# Patient Record
Sex: FEMALE | Race: BLACK OR AFRICAN AMERICAN
[De-identification: names, ages, dates, MRNs, and addresses within clinical notes are randomized per-mention and may not be internally consistent; named-entity substitution may affect disease eponyms.]

---

## 2020-07-15 ENCOUNTER — TRANSCRIPTION ENCOUNTER (OUTPATIENT)
Age: 45
End: 2020-07-15

## 2020-11-21 ENCOUNTER — TRANSCRIPTION ENCOUNTER (OUTPATIENT)
Age: 45
End: 2020-11-21

## 2020-12-22 ENCOUNTER — TRANSCRIPTION ENCOUNTER (OUTPATIENT)
Age: 45
End: 2020-12-22

## 2021-12-23 ENCOUNTER — TRANSCRIPTION ENCOUNTER (OUTPATIENT)
Age: 46
End: 2021-12-23

## 2022-03-26 ENCOUNTER — TRANSCRIPTION ENCOUNTER (OUTPATIENT)
Age: 47
End: 2022-03-26

## 2022-05-11 ENCOUNTER — NON-APPOINTMENT (OUTPATIENT)
Age: 47
End: 2022-05-11

## 2022-05-26 ENCOUNTER — NON-APPOINTMENT (OUTPATIENT)
Age: 47
End: 2022-05-26

## 2022-08-01 ENCOUNTER — NON-APPOINTMENT (OUTPATIENT)
Age: 47
End: 2022-08-01

## 2022-08-12 ENCOUNTER — NON-APPOINTMENT (OUTPATIENT)
Age: 47
End: 2022-08-12

## 2022-09-22 ENCOUNTER — NON-APPOINTMENT (OUTPATIENT)
Age: 47
End: 2022-09-22

## 2022-09-27 ENCOUNTER — NON-APPOINTMENT (OUTPATIENT)
Age: 47
End: 2022-09-27

## 2022-10-12 PROBLEM — Z00.00 ENCOUNTER FOR PREVENTIVE HEALTH EXAMINATION: Status: ACTIVE | Noted: 2022-10-12

## 2022-10-13 ENCOUNTER — APPOINTMENT (OUTPATIENT)
Dept: SURGERY | Facility: CLINIC | Age: 47
End: 2022-10-13

## 2022-10-13 VITALS
DIASTOLIC BLOOD PRESSURE: 76 MMHG | SYSTOLIC BLOOD PRESSURE: 124 MMHG | WEIGHT: 220 LBS | BODY MASS INDEX: 34.53 KG/M2 | HEART RATE: 76 BPM | HEIGHT: 67 IN

## 2022-10-13 DIAGNOSIS — L72.9 FOLLICULAR CYST OF THE SKIN AND SUBCUTANEOUS TISSUE, UNSPECIFIED: ICD-10-CM

## 2022-10-13 DIAGNOSIS — D05.10 INTRADUCTAL CARCINOMA IN SITU OF UNSPECIFIED BREAST: ICD-10-CM

## 2022-10-13 DIAGNOSIS — G47.30 SLEEP APNEA, UNSPECIFIED: ICD-10-CM

## 2022-10-13 DIAGNOSIS — Z78.9 OTHER SPECIFIED HEALTH STATUS: ICD-10-CM

## 2022-10-13 DIAGNOSIS — L08.9 FOLLICULAR CYST OF THE SKIN AND SUBCUTANEOUS TISSUE, UNSPECIFIED: ICD-10-CM

## 2022-10-13 DIAGNOSIS — M19.90 UNSPECIFIED OSTEOARTHRITIS, UNSPECIFIED SITE: ICD-10-CM

## 2022-10-13 PROCEDURE — 99203 OFFICE O/P NEW LOW 30 MIN: CPT | Mod: 25

## 2022-10-13 PROCEDURE — 10060 I&D ABSCESS SIMPLE/SINGLE: CPT

## 2022-10-13 RX ORDER — TRAZODONE HYDROCHLORIDE 300 MG/1
TABLET ORAL
Refills: 0 | Status: ACTIVE | COMMUNITY

## 2022-10-13 RX ORDER — TAMOXIFEN CITRATE 20 MG/1
TABLET, FILM COATED ORAL
Refills: 0 | Status: ACTIVE | COMMUNITY

## 2022-10-13 NOTE — PHYSICAL EXAM
[Alert] : alert [Oriented to Person] : oriented to person [Oriented to Place] : oriented to place [Oriented to Time] : oriented to time [Calm] : calm [de-identified] : A/Ox3; NAD. appears comfortable [de-identified] : EOMI; sclera anicteric. [de-identified] : no cervical lymphadenopathy  [de-identified] : airway patent, no use of accessory muscles [de-identified] : abd is soft, NT/ND\par  [de-identified] : infected cyst, appears tender w indurated tissue - upper back

## 2022-10-13 NOTE — REVIEW OF SYSTEMS
[Skin Lesions] : skin lesion [Fever] : no fever [Chills] : no chills [Feeling Poorly] : not feeling poorly [Chest Pain] : no chest pain [Lower Ext Edema] : no lower extremity edema [Shortness Of Breath] : no shortness of breath [Cough] : no cough [Abdominal Pain] : no abdominal pain [Pelvic Pain] : no pelvic pain [Arthralgias] : no arthralgias [Dizziness] : no dizziness [Anxiety] : no anxiety [Muscle Weakness] : no muscle weakness [Swollen Glands] : no swollen glands [de-identified] : infected cyst, upper back

## 2022-10-13 NOTE — PLAN
[FreeTextEntry1] : PROCEDURE\par Informed consent was obtained. All the options, benefits and risks of the procedure discussed.\par Pre op diagnosis: infected cyst, upper back \par Post op diagnosis: same\par Procedure: Incision and drainage of abscess\par Anaesthesia: Local\par Prepping and draping was done in the usual sterile manner. Local anesthetic / lidocaine was injected in the area. The fluctuant part was incised and the pus was drained. Hemostat was used to drain the cavity. Irrigated and packed. Sent for culture. Dressings applied\par Wound care discussed.\par \par

## 2022-10-13 NOTE — HISTORY OF PRESENT ILLNESS
[de-identified] : GRACIA RUIZ is a 47 year old F who is referred to the office for consultation visit, she presents w the cc of having an infected cyst to the upper back x 1 week. Patient states she's been to the dermatologist who manually squeezed the cyst and drained it. She states the area is  and has not fully resolved.

## 2022-10-13 NOTE — CONSULT LETTER
[Dear  ___] : Dear  [unfilled], [Consult Letter:] : I had the pleasure of evaluating your patient, [unfilled]. [Consult Closing:] : Thank you very much for allowing me to participate in the care of this patient.  If you have any questions, please do not hesitate to contact me. [Sincerely,] : Sincerely, [FreeTextEntry3] : Isiah Sánchez MD\par

## 2022-10-19 LAB — BACTERIA WND CULT: ABNORMAL

## 2022-11-02 ENCOUNTER — NON-APPOINTMENT (OUTPATIENT)
Age: 47
End: 2022-11-02

## 2022-11-22 ENCOUNTER — NON-APPOINTMENT (OUTPATIENT)
Age: 47
End: 2022-11-22

## 2022-12-07 ENCOUNTER — NON-APPOINTMENT (OUTPATIENT)
Age: 47
End: 2022-12-07

## 2022-12-23 ENCOUNTER — NON-APPOINTMENT (OUTPATIENT)
Age: 47
End: 2022-12-23

## 2023-10-14 ENCOUNTER — NON-APPOINTMENT (OUTPATIENT)
Age: 48
End: 2023-10-14

## 2023-11-27 ENCOUNTER — NON-APPOINTMENT (OUTPATIENT)
Age: 48
End: 2023-11-27

## 2024-02-24 ENCOUNTER — NON-APPOINTMENT (OUTPATIENT)
Age: 49
End: 2024-02-24

## 2024-04-18 ENCOUNTER — NON-APPOINTMENT (OUTPATIENT)
Age: 49
End: 2024-04-18

## 2024-04-30 ENCOUNTER — APPOINTMENT (OUTPATIENT)
Dept: OTOLARYNGOLOGY | Facility: CLINIC | Age: 49
End: 2024-04-30
Payer: COMMERCIAL

## 2024-04-30 DIAGNOSIS — Z87.09 PERSONAL HISTORY OF OTHER DISEASES OF THE RESPIRATORY SYSTEM: ICD-10-CM

## 2024-04-30 DIAGNOSIS — Z78.9 OTHER SPECIFIED HEALTH STATUS: ICD-10-CM

## 2024-04-30 DIAGNOSIS — Z85.9 PERSONAL HISTORY OF MALIGNANT NEOPLASM, UNSPECIFIED: ICD-10-CM

## 2024-04-30 DIAGNOSIS — Z87.39 PERSONAL HISTORY OF OTHER DISEASES OF THE MUSCULOSKELETAL SYSTEM AND CONNECTIVE TISSUE: ICD-10-CM

## 2024-04-30 DIAGNOSIS — Z80.9 FAMILY HISTORY OF MALIGNANT NEOPLASM, UNSPECIFIED: ICD-10-CM

## 2024-04-30 DIAGNOSIS — Z86.2 PERSONAL HISTORY OF DISEASES OF THE BLOOD AND BLOOD-FORMING ORGANS AND CERTAIN DISORDERS INVOLVING THE IMMUNE MECHANISM: ICD-10-CM

## 2024-04-30 PROCEDURE — 30901 CONTROL OF NOSEBLEED: CPT

## 2024-04-30 PROCEDURE — 99203 OFFICE O/P NEW LOW 30 MIN: CPT | Mod: 25

## 2024-04-30 RX ORDER — BENZONATATE 100 MG/1
100 CAPSULE ORAL
Refills: 0 | Status: ACTIVE | COMMUNITY

## 2024-04-30 RX ORDER — CETIRIZINE HYDROCHLORIDE 10 MG/1
10 TABLET, COATED ORAL
Refills: 0 | Status: ACTIVE | COMMUNITY

## 2024-04-30 RX ORDER — AMOXICILLIN AND CLAVULANATE POTASSIUM 875; 125 MG/1; MG/1
875-125 TABLET, COATED ORAL TWICE DAILY
Qty: 20 | Refills: 0 | Status: ACTIVE | COMMUNITY
Start: 2024-04-30 | End: 1900-01-01

## 2024-04-30 RX ORDER — FAMOTIDINE 40 MG/1
40 TABLET, FILM COATED ORAL
Qty: 30 | Refills: 2 | Status: ACTIVE | COMMUNITY
Start: 2024-04-30 | End: 1900-01-01

## 2024-04-30 RX ORDER — AZELASTINE HYDROCHLORIDE 137 UG/1
SPRAY, METERED NASAL
Refills: 0 | Status: ACTIVE | COMMUNITY

## 2024-04-30 RX ORDER — PSEUDOEPHEDRINE HYDROCHLORIDE 120 MG/1
120 TABLET, FILM COATED, EXTENDED RELEASE ORAL
Refills: 0 | Status: ACTIVE | COMMUNITY

## 2024-04-30 NOTE — PHYSICAL EXAM
[TextEntry] : PHYSICAL EXAM  General: The patient was alert and oriented and in no distress. Voice was normal.  Neurologic: Cranial Nerves II-XII intact PERRLA There was no significant nystagmus or disconjugate gaze noted.  EOM's: Normal  Face: The patient had no facial asymmetry or mass. The skin was unremarkable.  Ears: External ears were normal without deformity. Ear canals were normal. Tympanic membranes were intact and normal. No perforation or effusion  Nose:  The external nose had no significant deformity.  There was no facial tenderness.  On anterior rhinoscopy, the nasal mucosa was normal.  The anterior septum was normal. There were no visualized polyps purulence  or masses.  Oral cavity: The oral mucosa was normal. The oral and base of tongue were clear and without mass. The gingival and buccal mucosa were moist and without lesions. The palate moved well. There was no cleft palate. There appeared to be good salivary flow.   There was no pus, erythema or mass in the oral cavity/oropharynx.  Neck:  The neck was symmetrical. The parotid and submandibular glands were normal without masses. The trachea was midline and there was no unusual crepitus. Thyroid was smooth and nontender and no masses were palpated. Cervical adenopathy- none.  Pulmonary: Lungs clear to auscultation except wheeze RUL pt recently had negative CXR  Procedure: Nasal Endoscopy  Diagnosis: Chronic sinusitis  Dr. Bo Pickens  Anesthesia: Topical Lidocaine 2% and oxymetazoline  Procedure: The fiberoptic endoscope was introduced into the right nostril and passed along the floor of the nose and then  along the middle meatus. The same procedure was carried out  on the left side.  Findings:   Septum:  deviated bilaterally         Right:    Middle Turbinate: normal Middle meatus congested no polyps or pus  Superior meatus:normal SER:normal Inferior Turbinate: normal  Left:      Middle Turbinate: normal Middle meatus congested no polyps or pus Superior meatus:normal SER:normal Inferior Turbinate: normal    Procedure: Fiberoptic Nasolaryngoscopy Dx: Dysphagia, LPRD, Hoarseness Dr. Bo Pickens Anesthetic: Lidocaine and oxymetazoline topical   Findings: The flexible scope was easily passed via the nose. The larynx was grossly normal. The epiglottis was normal. The hypopharynx and base of tongue were normal. The vallecula was normal. The vocal folds were grossly normal and moved normally. There are  mild changes of laryngopharyngeal reflux  (LPR)  manifest by mild ventricular swelling,  posterior commissure thickening.   No lesions were noted.

## 2024-04-30 NOTE — ASSESSMENT
[FreeTextEntry1] : GRACIA RUIZ has bronchities and LPR/ I will prescribe Augmentin. I suggested and discussed in detail a strict reflux diet and gave the patient a handout.  I am recommending Famotidine 40mg  before bedtime.

## 2024-04-30 NOTE — HISTORY OF PRESENT ILLNESS
[de-identified] : GRACIA RUIZ  is a 49 year woman with a history of recurrent COVID. Her last bout was several weeks ago. She has heavy PND and coughing up yellow mucus. She has been sneezing.

## 2024-04-30 NOTE — REVIEW OF SYSTEMS
[Nasal Congestion] : nasal congestion [Patient Intake Form Reviewed] : Patient intake form was reviewed [de-identified] : post nasal drip

## 2024-05-24 ENCOUNTER — APPOINTMENT (OUTPATIENT)
Dept: OTOLARYNGOLOGY | Facility: CLINIC | Age: 49
End: 2024-05-24
Payer: COMMERCIAL

## 2024-05-24 VITALS — BODY MASS INDEX: 32.96 KG/M2 | WEIGHT: 210 LBS | HEIGHT: 67 IN

## 2024-05-24 DIAGNOSIS — K21.9 GASTRO-ESOPHAGEAL REFLUX DISEASE W/OUT ESOPHAGITIS: ICD-10-CM

## 2024-05-24 DIAGNOSIS — J31.0 CHRONIC RHINITIS: ICD-10-CM

## 2024-05-24 PROCEDURE — 99213 OFFICE O/P EST LOW 20 MIN: CPT | Mod: 25

## 2024-05-24 PROCEDURE — 31575 DIAGNOSTIC LARYNGOSCOPY: CPT

## 2024-05-24 NOTE — REASON FOR VISIT
[Subsequent Evaluation] : a subsequent evaluation for [FreeTextEntry2] : nasal congestion, post nasal drip

## 2024-05-24 NOTE — PHYSICAL EXAM
[TextEntry] : PHYSICAL EXAM  General: The patient was alert and oriented and in no distress. Voice was normal.  Neurologic: Cranial Nerves II-XII intact PERRLA There was no significant nystagmus or disconjugate gaze noted.  EOM's: Normal  Face: The patient had no facial asymmetry or mass. The skin was unremarkable.  Ears: External ears were normal without deformity. Ear canals were normal. Tympanic membranes were intact and normal. No perforation or effusion  Nose:  The external nose had no significant deformity.  There was no facial tenderness.  On anterior rhinoscopy, the nasal mucosa was normal.  The anterior septum was normal. There were no visualized polyps purulence  or masses.  Oral cavity: The oral mucosa was normal. The oral and base of tongue were clear and without mass. The gingival and buccal mucosa were moist and without lesions. The palate moved well. There was no cleft palate. There appeared to be good salivary flow.   There was no pus, erythema or mass in the oral cavity/oropharynx.  Neck:  The neck was symmetrical. The parotid and submandibular glands were normal without masses. The trachea was midline and there was no unusual crepitus. Thyroid was smooth and nontender and no masses were palpated. Cervical adenopathy- none.  Procedure: Nasal Endoscopy  Diagnosis: Chronic sinusitis  Dr. Bo Pickens  Anesthesia: Topical Lidocaine 2% and oxymetazoline  Procedure: The fiberoptic endoscope was introduced into the right nostril and passed along the floor of the nose and then  along the middle meatus. The same procedure was carried out  on the left side.  Findings:   Septum: mildly deviated bilaterally         Right:    Middle Turbinate: normal Middle meatus congested no polyps or pus  Superior meatus:normal SER:normal Inferior Turbinate: normal  Left:      Middle Turbinate: normal Middle meatus congested no polyps or pus Superior meatus:normal SER:normal Inferior Turbinate: normal   Procedure: Fiberoptic Nasolaryngoscopy Dx: Dysphagia, LPRD, Hoarseness Dr. Bo Pickens Anesthetic: Lidocaine and oxymetazoline topical   Findings: The flexible scope was easily passed via the nose. The larynx was grossly normal. The epiglottis was normal. The hypopharynx and base of tongue were normal. The vallecula was normal. The vocal folds were grossly normal and moved normally. There are  mild changes of laryngopharyngeal reflux  (LPR)  manifest by mild ventricular swelling,  posterior commissure thickening.   No lesions were noted.

## 2024-05-24 NOTE — HISTORY OF PRESENT ILLNESS
[de-identified] : GRACIA RUIZ  is a 49 year woman with a history of COVID, LPR and sinusitis. She is feeling very well.

## 2024-05-24 NOTE — ASSESSMENT
[FreeTextEntry1] : GRACIA RUIZ is doing very well. I suggested Pepcid 20mg hs for 2 weeks and slowly liberalizing her diet. RTC prn

## 2024-09-26 ENCOUNTER — NON-APPOINTMENT (OUTPATIENT)
Age: 49
End: 2024-09-26

## 2025-03-13 ENCOUNTER — NON-APPOINTMENT (OUTPATIENT)
Age: 50
End: 2025-03-13

## 2025-08-12 ENCOUNTER — APPOINTMENT (OUTPATIENT)
Dept: OTOLARYNGOLOGY | Facility: CLINIC | Age: 50
End: 2025-08-12
Payer: COMMERCIAL

## 2025-08-12 DIAGNOSIS — E04.1 NONTOXIC SINGLE THYROID NODULE: ICD-10-CM

## 2025-08-12 DIAGNOSIS — K21.9 GASTRO-ESOPHAGEAL REFLUX DISEASE W/OUT ESOPHAGITIS: ICD-10-CM

## 2025-08-12 PROCEDURE — 99213 OFFICE O/P EST LOW 20 MIN: CPT

## 2025-08-14 ENCOUNTER — APPOINTMENT (OUTPATIENT)
Dept: ULTRASOUND IMAGING | Facility: HOSPITAL | Age: 50
End: 2025-08-14

## 2025-08-23 ENCOUNTER — APPOINTMENT (OUTPATIENT)
Dept: ULTRASOUND IMAGING | Facility: CLINIC | Age: 50
End: 2025-08-23

## 2025-08-23 PROCEDURE — 76536 US EXAM OF HEAD AND NECK: CPT

## 2025-09-07 ENCOUNTER — NON-APPOINTMENT (OUTPATIENT)
Age: 50
End: 2025-09-07

## 2025-09-08 ENCOUNTER — NON-APPOINTMENT (OUTPATIENT)
Age: 50
End: 2025-09-08